# Patient Record
Sex: FEMALE
[De-identification: names, ages, dates, MRNs, and addresses within clinical notes are randomized per-mention and may not be internally consistent; named-entity substitution may affect disease eponyms.]

---

## 2020-01-05 ENCOUNTER — HOSPITAL ENCOUNTER (EMERGENCY)
Dept: HOSPITAL 18 - NAV ERS | Age: 20
Discharge: ANOTHER HOSPITAL | End: 2020-01-05
Payer: COMMERCIAL

## 2020-01-05 ENCOUNTER — HOSPITAL ENCOUNTER (INPATIENT)
Dept: HOSPITAL 92 - ERS | Age: 20
LOS: 5 days | Discharge: HOME | DRG: 417 | End: 2020-01-10
Attending: INTERNAL MEDICINE | Admitting: INTERNAL MEDICINE
Payer: COMMERCIAL

## 2020-01-05 VITALS — BODY MASS INDEX: 27.3 KG/M2

## 2020-01-05 DIAGNOSIS — E11.649: ICD-10-CM

## 2020-01-05 DIAGNOSIS — R07.9: Primary | ICD-10-CM

## 2020-01-05 DIAGNOSIS — K81.0: Primary | ICD-10-CM

## 2020-01-05 DIAGNOSIS — E86.0: ICD-10-CM

## 2020-01-05 DIAGNOSIS — E87.1: ICD-10-CM

## 2020-01-05 DIAGNOSIS — E87.5: ICD-10-CM

## 2020-01-05 DIAGNOSIS — K76.0: ICD-10-CM

## 2020-01-05 DIAGNOSIS — R10.11: ICD-10-CM

## 2020-01-05 DIAGNOSIS — E87.2: ICD-10-CM

## 2020-01-05 DIAGNOSIS — E11.10: ICD-10-CM

## 2020-01-05 DIAGNOSIS — I10: ICD-10-CM

## 2020-01-05 DIAGNOSIS — E87.6: ICD-10-CM

## 2020-01-05 LAB
ALBUMIN SERPL BCG-MCNC: 4.8 G/DL (ref 3.5–5)
ALP SERPL-CCNC: 88 U/L (ref 40–100)
ALT SERPL W P-5'-P-CCNC: 72 U/L (ref 8–55)
ANION GAP SERPL CALC-SCNC: (no result) MMOL/L (ref 10–20)
AST SERPL-CCNC: 31 U/L (ref 5–34)
BASOPHILS # BLD AUTO: 0.1 THOU/UL (ref 0–0.2)
BASOPHILS # BLD AUTO: 0.1 THOU/UL (ref 0–0.2)
BASOPHILS NFR BLD AUTO: 0.7 % (ref 0–1)
BASOPHILS NFR BLD AUTO: 0.9 % (ref 0–1)
BICARBONATE (HCO3V): 12 MMOL/L (ref 22–28)
BILIRUB SERPL-MCNC: 0.4 MG/DL (ref 0.2–1.2)
BUN SERPL-MCNC: 8 MG/DL (ref 7–18.7)
CA-I BLD-SCNC: 0.94 MMOL/L
CA-I BLDV-SCNC: 1.01 MMOL/L
CALCIUM SERPL-MCNC: 8.9 MG/DL (ref 7.8–10.44)
CHLORIDE BLDV-SCNC: 109 MMOL/L (ref 98–107)
CHLORIDE SERPL-SCNC: 111 MMOL/L (ref 98–107)
CHLORIDE SERPL-SCNC: 99 MMOL/L (ref 98–107)
CO2 BLDV CALC-SCNC: 11.3 MMOL/L (ref 22–28)
CO2 BLDV CALC-SCNC: 13 MMOL/L (ref 22–28)
CO2 SERPL-SCNC: (no result) MMOL/L (ref 22–29)
CO2 TENSION (PVCO2): 32.7 MMHG (ref 40–50)
CREAT CL PREDICTED SERPL C-G-VRATE: 0 ML/MIN (ref 70–130)
EOSINOPHIL # BLD AUTO: 0 THOU/UL (ref 0–0.7)
EOSINOPHIL # BLD AUTO: 0 THOU/UL (ref 0–0.7)
EOSINOPHIL NFR BLD AUTO: 0.1 % (ref 0–10)
EOSINOPHIL NFR BLD AUTO: 0.2 % (ref 0–10)
GLOBULIN SER CALC-MCNC: 2.9 G/DL (ref 2.4–3.5)
GLUCOSE SERPL-MCNC: 338 MG/DL (ref 70–105)
GLUCOSE UR STRIP-MCNC: 500 MG/DL
HCO3 BLDV-SCNC: 10.4 MMOL/L (ref 22–28)
HCT VFR BLD CALC: 38 % (ref 36–47)
HCT VFR BLD CALC: 38 % (ref 36–47)
HEMOGLOBIN - CALC: 12.9 G/DL (ref 12–16)
HGB BLD CALC-MCNC: 13 G/DL (ref 12–16)
HGB BLD-MCNC: 12.5 G/DL (ref 12–16)
HGB BLD-MCNC: 15 G/DL (ref 12–16)
LIPASE SERPL-CCNC: 103 U/L (ref 8–78)
LYMPHOCYTES # BLD AUTO: 2 THOU/UL (ref 1.2–3.4)
LYMPHOCYTES # BLD: 4.1 THOU/UL (ref 1.2–3.4)
LYMPHOCYTES NFR BLD AUTO: 14.3 % (ref 28–48)
LYMPHOCYTES NFR BLD AUTO: 26.3 % (ref 28–48)
MCH RBC QN AUTO: 28.6 PG (ref 25–35)
MCH RBC QN AUTO: 30.3 PG (ref 25–35)
MCV RBC AUTO: 87.6 FL (ref 78–98)
MCV RBC AUTO: 87.9 FL (ref 78–98)
MONOCYTES # BLD AUTO: 0.6 THOU/UL (ref 0.11–0.59)
MONOCYTES # BLD AUTO: 0.7 THOU/UL (ref 0.11–0.59)
MONOCYTES NFR BLD AUTO: 4.4 % (ref 0–4)
MONOCYTES NFR BLD AUTO: 4.6 % (ref 0–4)
NEUTROPHILS # BLD AUTO: 10.6 THOU/UL (ref 1.4–6.5)
NEUTROPHILS # BLD AUTO: 11.2 THOU/UL (ref 1.4–6.5)
NEUTROPHILS NFR BLD AUTO: 68.4 % (ref 31–61)
NEUTROPHILS NFR BLD AUTO: 80.1 % (ref 31–61)
PCO2 BLDV: 30.1 MMHG (ref 40–50)
PLATELET # BLD AUTO: 216 THOU/UL (ref 130–400)
PLATELET # BLD AUTO: 295 THOU/UL (ref 130–400)
POTASSIUM BLDV-SCNC: 4.6 MMOL/L (ref 3.5–5.1)
POTASSIUM SERPL-SCNC: 4.6 MMOL/L (ref 3.5–5.1)
POTASSIUM SERPL-SCNC: 4.7 MMOL/L (ref 3.5–5.1)
PREGS CONTROL BACKGROUND?: (no result)
PREGS CONTROL BAR APPEAR?: YES
PREGS CONTROL BAR APPEAR?: YES
PROT UR STRIP.AUTO-MCNC: (no result) MG/DL
RBC # BLD AUTO: 4.14 MILL/UL (ref 4–5.2)
RBC # BLD AUTO: 5.23 MILL/UL (ref 4–5.2)
SAO2 % BLDV FROM PO2: 49.7 % (ref 60–85)
SAO2 % BLDV FROM PO2: 96.7 % (ref 60–85)
SODIUM BLDV-SCNC: 133 MMOL/L (ref 138–145)
SODIUM SERPL-SCNC: 129 MMOL/L (ref 136–145)
SODIUM SERPL-SCNC: 133 MMOL/L (ref 138–145)
WBC # BLD AUTO: 14 THOU/UL (ref 4.8–10.8)
WBC # BLD AUTO: 15.5 THOU/UL (ref 4.8–10.8)
WBC UR QL AUTO: (no result) HPF (ref 0–3)

## 2020-01-05 PROCEDURE — 84100 ASSAY OF PHOSPHORUS: CPT

## 2020-01-05 PROCEDURE — 83690 ASSAY OF LIPASE: CPT

## 2020-01-05 PROCEDURE — 80076 HEPATIC FUNCTION PANEL: CPT

## 2020-01-05 PROCEDURE — 78227 HEPATOBIL SYST IMAGE W/DRUG: CPT

## 2020-01-05 PROCEDURE — 87040 BLOOD CULTURE FOR BACTERIA: CPT

## 2020-01-05 PROCEDURE — 84484 ASSAY OF TROPONIN QUANT: CPT

## 2020-01-05 PROCEDURE — 83036 HEMOGLOBIN GLYCOSYLATED A1C: CPT

## 2020-01-05 PROCEDURE — 36415 COLL VENOUS BLD VENIPUNCTURE: CPT

## 2020-01-05 PROCEDURE — 85025 COMPLETE CBC W/AUTO DIFF WBC: CPT

## 2020-01-05 PROCEDURE — 84703 CHORIONIC GONADOTROPIN ASSAY: CPT

## 2020-01-05 PROCEDURE — A9537 TC99M MEBROFENIN: HCPCS

## 2020-01-05 PROCEDURE — 82330 ASSAY OF CALCIUM: CPT

## 2020-01-05 PROCEDURE — 82010 KETONE BODYS QUAN: CPT

## 2020-01-05 PROCEDURE — 81003 URINALYSIS AUTO W/O SCOPE: CPT

## 2020-01-05 PROCEDURE — 36416 COLLJ CAPILLARY BLOOD SPEC: CPT

## 2020-01-05 PROCEDURE — S0028 INJECTION, FAMOTIDINE, 20 MG: HCPCS

## 2020-01-05 PROCEDURE — S0020 INJECTION, BUPIVICAINE HYDRO: HCPCS

## 2020-01-05 PROCEDURE — 93005 ELECTROCARDIOGRAM TRACING: CPT

## 2020-01-05 PROCEDURE — 71275 CT ANGIOGRAPHY CHEST: CPT

## 2020-01-05 PROCEDURE — 80307 DRUG TEST PRSMV CHEM ANLYZR: CPT

## 2020-01-05 PROCEDURE — 80053 COMPREHEN METABOLIC PANEL: CPT

## 2020-01-05 PROCEDURE — 83605 ASSAY OF LACTIC ACID: CPT

## 2020-01-05 PROCEDURE — 76705 ECHO EXAM OF ABDOMEN: CPT

## 2020-01-05 PROCEDURE — 81015 MICROSCOPIC EXAM OF URINE: CPT

## 2020-01-05 PROCEDURE — 96361 HYDRATE IV INFUSION ADD-ON: CPT

## 2020-01-05 PROCEDURE — 71046 X-RAY EXAM CHEST 2 VIEWS: CPT

## 2020-01-05 PROCEDURE — 96374 THER/PROPH/DIAG INJ IV PUSH: CPT

## 2020-01-05 PROCEDURE — 85379 FIBRIN DEGRADATION QUANT: CPT

## 2020-01-05 PROCEDURE — 83880 ASSAY OF NATRIURETIC PEPTIDE: CPT

## 2020-01-05 PROCEDURE — 80048 BASIC METABOLIC PNL TOTAL CA: CPT

## 2020-01-05 PROCEDURE — 87086 URINE CULTURE/COLONY COUNT: CPT

## 2020-01-05 PROCEDURE — 82803 BLOOD GASES ANY COMBINATION: CPT

## 2020-01-05 PROCEDURE — 88304 TISSUE EXAM BY PATHOLOGIST: CPT

## 2020-01-05 PROCEDURE — 80306 DRUG TEST PRSMV INSTRMNT: CPT

## 2020-01-05 PROCEDURE — 96375 TX/PRO/DX INJ NEW DRUG ADDON: CPT

## 2020-01-05 PROCEDURE — 83735 ASSAY OF MAGNESIUM: CPT

## 2020-01-05 PROCEDURE — C9113 INJ PANTOPRAZOLE SODIUM, VIA: HCPCS

## 2020-01-05 NOTE — RAD
PA AND LATERAL VIEWS CHEST:

 

Date:  01/05/20

 

HISTORY:  

Chest pain and dyspnea. 

 

FINDINGS:

The cardiomediastinum is normal. The lungs are well expanded and clear. The bony thorax is normal. 

 

IMPRESSION: 

Normal exam. 

 

 

POS: KERVIN

## 2020-01-05 NOTE — ULT
US Gallbladder RUQ



HISTORY: Right upper quadrant pain.



COMPARISON: None.



FINDINGS: Real-time imaging of the right upper quadrant shows a normal-appearing gallbladder. No gall
bladder wall thickening is seen. The common duct is 2 to 3 mm. There does appear to be a tiny

amount of fluid adjacent to the gallbladder. There are diffuse fatty changes of the liver. The techno
logist does describe a positive ultrasound Anthony's sign.



Right kidney is normal in size and not obstructed. The liver measures 20 cm in length. The pancreas i
s obscured.



IMPRESSION: No evidence of gallstones. There does appear to be some minimal fluid or edema adjacent t
o the gallbladder but no gallbladder wall thickening. Technologist does describe a positive

ultrasound Anthony's sign. Further evaluation with hepatobiliary scan may be beneficial. The liver roger
ws diffuse fatty change and appears slightly enlarged.



Reported By: Rashaun Jones 

Electronically Signed:  1/5/2020 11:45 PM

## 2020-01-05 NOTE — CT
CT ANGIO CHEST PERFORMED WITH IV CONTRAST ENHANCEMENT AND 3D RECONSTRUCTIONS:

 

Date:  01/05/2020

 

HISTORY:  

Shortness of breath. 

 

FINDINGS:

There are some minimal subsegmental atelectatic changes in the lung bases. No pleural effusions. 

 

The thoracic aorta is normal in caliber. No mediastinal or hilar adenopathy. 

 

There is fair pulmonary artery opacification. There is some motion artifact noted. I see no signs of 
any central pulmonary embolus. Smaller peripheral emboli are not excluded. 

 

There are diffuse fatty changes of the liver. Visualized portion of the pancreas appears unremarkable
. 

 

A hyperdense focus within the right lobe of the liver could represent fatty sparing. There is a simil
ar area in the left lobe. 

 

IMPRESSION: 

1.  No CT evidence for pulmonary embolus. 

2.  Fatty changes of the liver. 

 

 

POS: MARCELINA

## 2020-01-06 LAB
ALBUMIN SERPL BCG-MCNC: 3.5 G/DL (ref 3.5–5)
ALP SERPL-CCNC: 71 U/L (ref 40–100)
ALT SERPL W P-5'-P-CCNC: 49 U/L (ref 8–55)
ANION GAP SERPL CALC-SCNC: (no result) MMOL/L (ref 10–20)
ANION GAP SERPL CALC-SCNC: 11 MMOL/L (ref 10–20)
ANION GAP SERPL CALC-SCNC: 12 MMOL/L (ref 10–20)
ANION GAP SERPL CALC-SCNC: 14 MMOL/L (ref 10–20)
APAP SERPL-MCNC: (no result) MCG/ML (ref 10–30)
AST SERPL-CCNC: 27 U/L (ref 5–34)
BASOPHILS # BLD AUTO: 0.1 THOU/UL (ref 0–0.2)
BASOPHILS NFR BLD AUTO: 0.7 % (ref 0–1)
BILIRUB SERPL-MCNC: 0.4 MG/DL (ref 0.2–1.2)
BUN SERPL-MCNC: (no result) MG/DL (ref 7–18.7)
BUN SERPL-MCNC: 5 MG/DL (ref 7–18.7)
CALCIUM SERPL-MCNC: 7.2 MG/DL (ref 7.8–10.44)
CALCIUM SERPL-MCNC: 7.5 MG/DL (ref 7.8–10.44)
CALCIUM SERPL-MCNC: 7.7 MG/DL (ref 7.8–10.44)
CALCIUM SERPL-MCNC: 7.9 MG/DL (ref 7.8–10.44)
CHLORIDE SERPL-SCNC: 108 MMOL/L (ref 98–107)
CHLORIDE SERPL-SCNC: 109 MMOL/L (ref 98–107)
CHLORIDE SERPL-SCNC: 109 MMOL/L (ref 98–107)
CHLORIDE SERPL-SCNC: 110 MMOL/L (ref 98–107)
CO2 SERPL-SCNC: (no result) MMOL/L (ref 22–29)
CO2 SERPL-SCNC: 11 MMOL/L (ref 22–29)
CO2 SERPL-SCNC: 15 MMOL/L (ref 22–29)
CO2 SERPL-SCNC: 16 MMOL/L (ref 22–29)
CREAT CL PREDICTED SERPL C-G-VRATE: 0 ML/MIN (ref 70–130)
CREAT CL PREDICTED SERPL C-G-VRATE: 132 ML/MIN (ref 70–130)
CREAT CL PREDICTED SERPL C-G-VRATE: 138 ML/MIN (ref 70–130)
CREAT CL PREDICTED SERPL C-G-VRATE: 150 ML/MIN (ref 70–130)
DRUG SCREEN CUTOFF: (no result)
EOSINOPHIL # BLD AUTO: 0 THOU/UL (ref 0–0.7)
EOSINOPHIL NFR BLD AUTO: 0.3 % (ref 0–10)
GLOBULIN SER CALC-MCNC: 3.3 G/DL (ref 2.4–3.5)
GLUCOSE SERPL-MCNC: 154 MG/DL (ref 70–105)
GLUCOSE SERPL-MCNC: 180 MG/DL (ref 70–105)
GLUCOSE SERPL-MCNC: 212 MG/DL (ref 70–105)
GLUCOSE SERPL-MCNC: 228 MG/DL (ref 70–105)
HGB BLD-MCNC: 11.4 G/DL (ref 12–16)
LIPASE SERPL-CCNC: 106 U/L (ref 8–78)
LYMPHOCYTES # BLD: 3 THOU/UL (ref 1.2–3.4)
LYMPHOCYTES NFR BLD AUTO: 21.8 % (ref 28–48)
MCH RBC QN AUTO: 28.6 PG (ref 25–35)
MCV RBC AUTO: 85.5 FL (ref 78–98)
MEDTOX CONTROL LINE VALID?: (no result)
MEDTOX READER #: (no result)
MONOCYTES # BLD AUTO: 0.6 THOU/UL (ref 0.11–0.59)
MONOCYTES NFR BLD AUTO: 4.5 % (ref 0–4)
NEUTROPHILS # BLD AUTO: 9.9 THOU/UL (ref 1.4–6.5)
NEUTROPHILS NFR BLD AUTO: 72.8 % (ref 31–61)
PLATELET # BLD AUTO: 204 THOU/UL (ref 130–400)
POTASSIUM SERPL-SCNC: 3.5 MMOL/L (ref 3.5–5.1)
POTASSIUM SERPL-SCNC: 3.6 MMOL/L (ref 3.5–5.1)
POTASSIUM SERPL-SCNC: 3.7 MMOL/L (ref 3.5–5.1)
POTASSIUM SERPL-SCNC: 4.8 MMOL/L (ref 3.5–5.1)
PROT UR STRIP.AUTO-MCNC: 20 MG/DL
RBC # BLD AUTO: 3.98 MILL/UL (ref 4–5.2)
SALICYLATES SERPL-MCNC: (no result) MG/DL (ref 15–30)
SODIUM SERPL-SCNC: 131 MMOL/L (ref 136–145)
SODIUM SERPL-SCNC: 131 MMOL/L (ref 136–145)
SODIUM SERPL-SCNC: 132 MMOL/L (ref 136–145)
SODIUM SERPL-SCNC: 133 MMOL/L (ref 136–145)
WBC # BLD AUTO: 13.7 THOU/UL (ref 4.8–10.8)

## 2020-01-06 RX ADMIN — METRONIDAZOLE SCH MLS: 500 INJECTION, SOLUTION INTRAVENOUS at 05:51

## 2020-01-06 RX ADMIN — POTASSIUM CHLORIDE, DEXTROSE MONOHYDRATE AND SODIUM CHLORIDE PRN MLS: 150; 5; 450 INJECTION, SOLUTION INTRAVENOUS at 22:52

## 2020-01-06 RX ADMIN — METRONIDAZOLE SCH MLS: 500 INJECTION, SOLUTION INTRAVENOUS at 15:11

## 2020-01-06 RX ADMIN — POTASSIUM CHLORIDE, DEXTROSE MONOHYDRATE AND SODIUM CHLORIDE PRN MLS: 150; 5; 450 INJECTION, SOLUTION INTRAVENOUS at 10:21

## 2020-01-06 RX ADMIN — POTASSIUM CHLORIDE, DEXTROSE MONOHYDRATE AND SODIUM CHLORIDE PRN MLS: 150; 5; 450 INJECTION, SOLUTION INTRAVENOUS at 15:12

## 2020-01-06 RX ADMIN — CEFTRIAXONE SCH MLS: 1 INJECTION, POWDER, FOR SOLUTION INTRAMUSCULAR; INTRAVENOUS at 03:24

## 2020-01-06 RX ADMIN — METRONIDAZOLE SCH MLS: 500 INJECTION, SOLUTION INTRAVENOUS at 21:41

## 2020-01-06 RX ADMIN — POTASSIUM CHLORIDE, DEXTROSE MONOHYDRATE AND SODIUM CHLORIDE PRN MLS: 150; 5; 450 INJECTION, SOLUTION INTRAVENOUS at 06:23

## 2020-01-06 RX ADMIN — INSULIN HUMAN SCH MLS: 100 INJECTION, SOLUTION PARENTERAL at 15:12

## 2020-01-06 NOTE — PDOC.EVN
Event Note





- Event Note


Event Note: 


Patient admitted with normoglycemic DKA, now with normalized gap, still with 

some acidosis but much better. Patient still with significant RUQ pain, had 

positive Anthony's during U/S and some fluid around gallbladder, but no stones/

sludge/wall thickening. On Rocephin. Will check HIDA scan in AM to make sure 

gallbladder not the instigator behind the DKA.

## 2020-01-06 NOTE — HP
PRIMARY CARE PHYSICIAN:  The patient currently does not have a primary care

physician. 



CHIEF COMPLAINT:  Abdominal pain.



HISTORY OF PRESENT ILLNESS:  Ms. Baez is a pleasant 19-year-old female, who has a

history of diabetes during pregnancy.  She says that while being pregnant, she

developed diabetes mellitus and says that the diabetes continued until after the

pregnancy.  She admits to being placed on medications for diabetes several months

after being pregnant and it appears after that she has essentially lost to followup

and has not been on any medications.  She says that on Friday afternoon, she was

coming back from an outing and noticed that she was having pain in the right upper

quadrant.  She says it radiated into her chest and she also was having some nausea.

She says that the pain would actually get better if she try to lay on her left side.

 It was worse with movement.  She vomited once, but denies any fevers or chills.  No

change in her bowels.  She denies any polyuria or polydipsia or any change in her

visions.  But as a result of the pain, she came to the ER for evaluation.  There,

she was found to be severely acidotic with a bicarb less than 8.  Her pH was 7.1 on

blood gas.  Her blood sugar was only slightly elevated, but she has an anion gap and

positive beta hydroxybutyrate, and she is being admitted for DKA. 



REVIEW OF SYSTEMS:  All systems were reviewed and are negative except for that

mentioned in the history of present illness. 



PAST MEDICAL HISTORY:  Significant for diabetes mellitus during pregnancy and

hypertension. 



PAST SURGICAL HISTORY:  She had a , as well as an appendectomy.



ALLERGIES:  NO KNOWN DRUG ALLERGIES.



SOCIAL HISTORY:  She is a nonsmoker and nondrinker.  She has one child.



FAMILY HISTORY:  Significant for diabetes mellitus in both parents.



CURRENT MEDICATIONS:  None.



PHYSICAL EXAMINATION:

GENERAL:  She is alert and oriented.  She appears to be in no acute distress.  She

is well developed and well nourished. 

VITAL SIGNS:  Her blood pressure was 136/90, heart rate 138, respiratory rate of 20,

temperature was 100, O2 saturation is 100% on room air. 

HEENT:  Pupils are equal, round, and reactive.  Extraocular muscles are intact.  Her

sclerae anicteric.  Throat, no erythema, no exudates. 

NECK:  No adenopathy.  No bruits. 

LUNGS:  Clear to auscultation.  There is no wheezing, no rales, no rhonchi. 

CARDIOVASCULAR:  She has a normal S1 and S2.  There is no S3 or S4.  No murmurs,

clicks, or rubs.  Heart rate is tachycardic. 

ABDOMEN:  Soft, obese.  She does have right upper quadrant tenderness.  She did have

some mild rebound tenderness, but there was no guarding.  Bowel sounds are present.

Her liver span did appear to be slightly enlarged. 

EXTREMITIES:  There is no clubbing or cyanosis.  No edema.  No calf tenderness.  No

joint effusions.  Good dorsalis pedis and posterior tibial pulses. 

SKIN AND INTEGUMENT:  No skin changes.  No rash.



LABORATORY RESULTS:  White blood cell count 15.5, hemoglobin 12.5, hematocrit is

36.3, and platelet count is 216.  Sodium 138, potassium 4.6, chloride is 111.

Urinalysis shows ketones as well as glucose, and beta hydroxybutyrate was elevated

at 3.18.  She had an abdominal ultrasound in which there was evidence of fatty

change.  No evidence of gallstones, but there was some minimal fluid adjacent to the

gallbladder, but no gallbladder wall thickening. 



ASSESSMENT:  This is a 19-year-old female, who presents to the emergency room with

abdominal pain, elevated white count, metabolic acidosis with high anion gap, I

calculated it out to be approximately 16, as well as positive beta hydroxybutyrate.

She will be admitted for diabetic ketoacidosis and started on an insulin drip.  She

may need D5 solution while on the insulin drip to protect her from hypoglycemia

until her anion gap and acidosis have improved.  We will re-evaluate her with regard

to the abdominal 

pain.  If this pain persist after the acidosis has improved, then it would likely be

in her best interest to get a HIDA scan to rule out acalculous cholecystitis.  We

will place her 

on empiric antibiotics including Rocephin and Flagyl.  In the interim, get blood

cultures as well.  Further recommendations to follow. 







Job ID:  [id number]

## 2020-01-07 LAB
ANION GAP SERPL CALC-SCNC: 11 MMOL/L (ref 10–20)
ANION GAP SERPL CALC-SCNC: 13 MMOL/L (ref 10–20)
BASOPHILS # BLD AUTO: 0 THOU/UL (ref 0–0.2)
BASOPHILS NFR BLD AUTO: 0.6 % (ref 0–1)
BUN SERPL-MCNC: (no result) MG/DL (ref 7–18.7)
BUN SERPL-MCNC: (no result) MG/DL (ref 7–18.7)
CALCIUM SERPL-MCNC: 8.2 MG/DL (ref 7.8–10.44)
CALCIUM SERPL-MCNC: 8.4 MG/DL (ref 7.8–10.44)
CHLORIDE SERPL-SCNC: 106 MMOL/L (ref 98–107)
CHLORIDE SERPL-SCNC: 107 MMOL/L (ref 98–107)
CO2 SERPL-SCNC: 21 MMOL/L (ref 22–29)
CO2 SERPL-SCNC: 23 MMOL/L (ref 22–29)
CREAT CL PREDICTED SERPL C-G-VRATE: 175 ML/MIN (ref 70–130)
CREAT CL PREDICTED SERPL C-G-VRATE: 183 ML/MIN (ref 70–130)
EOSINOPHIL # BLD AUTO: 0.1 THOU/UL (ref 0–0.7)
EOSINOPHIL NFR BLD AUTO: 1 % (ref 0–10)
GLUCOSE SERPL-MCNC: 165 MG/DL (ref 70–105)
GLUCOSE SERPL-MCNC: 205 MG/DL (ref 70–105)
HGB BLD-MCNC: 10.9 G/DL (ref 12–16)
LYMPHOCYTES # BLD: 1.7 THOU/UL (ref 1.2–3.4)
LYMPHOCYTES NFR BLD AUTO: 20.2 % (ref 28–48)
MCH RBC QN AUTO: 28.7 PG (ref 25–35)
MCV RBC AUTO: 84.3 FL (ref 78–98)
MONOCYTES # BLD AUTO: 0.5 THOU/UL (ref 0.11–0.59)
MONOCYTES NFR BLD AUTO: 5.6 % (ref 0–4)
NEUTROPHILS # BLD AUTO: 6 THOU/UL (ref 1.4–6.5)
NEUTROPHILS NFR BLD AUTO: 72.7 % (ref 31–61)
PLATELET # BLD AUTO: 194 THOU/UL (ref 130–400)
POTASSIUM SERPL-SCNC: 3.3 MMOL/L (ref 3.5–5.1)
POTASSIUM SERPL-SCNC: 3.7 MMOL/L (ref 3.5–5.1)
RBC # BLD AUTO: 3.79 MILL/UL (ref 4–5.2)
SODIUM SERPL-SCNC: 136 MMOL/L (ref 136–145)
SODIUM SERPL-SCNC: 138 MMOL/L (ref 136–145)
WBC # BLD AUTO: 8.2 THOU/UL (ref 4.8–10.8)

## 2020-01-07 RX ADMIN — POTASSIUM CHLORIDE, DEXTROSE MONOHYDRATE AND SODIUM CHLORIDE PRN MLS: 150; 5; 450 INJECTION, SOLUTION INTRAVENOUS at 03:04

## 2020-01-07 RX ADMIN — METRONIDAZOLE SCH MLS: 500 INJECTION, SOLUTION INTRAVENOUS at 14:03

## 2020-01-07 RX ADMIN — ONDANSETRON PRN MG: 2 INJECTION INTRAMUSCULAR; INTRAVENOUS at 14:23

## 2020-01-07 RX ADMIN — METRONIDAZOLE SCH MLS: 500 INJECTION, SOLUTION INTRAVENOUS at 22:07

## 2020-01-07 RX ADMIN — POTASSIUM CHLORIDE, DEXTROSE MONOHYDRATE AND SODIUM CHLORIDE PRN MLS: 150; 5; 450 INJECTION, SOLUTION INTRAVENOUS at 11:30

## 2020-01-07 RX ADMIN — METRONIDAZOLE SCH MLS: 500 INJECTION, SOLUTION INTRAVENOUS at 06:14

## 2020-01-07 RX ADMIN — POTASSIUM CHLORIDE, DEXTROSE MONOHYDRATE AND SODIUM CHLORIDE PRN MLS: 150; 5; 450 INJECTION, SOLUTION INTRAVENOUS at 17:27

## 2020-01-07 RX ADMIN — CEFTRIAXONE SCH MLS: 1 INJECTION, POWDER, FOR SOLUTION INTRAMUSCULAR; INTRAVENOUS at 03:03

## 2020-01-07 RX ADMIN — ONDANSETRON PRN MG: 2 INJECTION INTRAMUSCULAR; INTRAVENOUS at 10:19

## 2020-01-07 RX ADMIN — INSULIN HUMAN SCH MLS: 100 INJECTION, SOLUTION PARENTERAL at 07:22

## 2020-01-07 RX ADMIN — POTASSIUM CHLORIDE, DEXTROSE MONOHYDRATE AND SODIUM CHLORIDE PRN MLS: 150; 5; 450 INJECTION, SOLUTION INTRAVENOUS at 07:22

## 2020-01-07 NOTE — NM
HEPATOBILIARY SCAN:

 

HISTORY: 

Abdominal pain.  No gallstones on gallbladder of 1/5/2020.

 

RADIOPHARMACEUTICAL:

4.5 mCi Technetium 99m-Mebrofenin injected intravenously.

 

FINDINGS: 

There is good tracer extraction by the liver with prompt excretion into the biliary tract and small b
owel loops and normal filling of the gallbladder.  The calculated gallbladder ejection fraction after
 a 30-minute IV infusion of 1.1 mcg CCK measures 22%.

 

The patient was also pretreated with an IV dose of 1.1 mcg CCK-8 30 minutes prior to the ingestion of
 the radiopharmaceutical.

 

IMPRESSION: 

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

 

POS: OFF

## 2020-01-07 NOTE — CON
DATE OF CONSULTATION:  



REQUESTING PHYSICIAN:  Jose Elias Campos MD.



CONSULTING PHYSICIAN:  Dr. Rose.



HISTORY OF PRESENT ILLNESS:  Ms. Baez is a 19-year-old female who came into

evaluation of abdominal pain.  The patient reports she had abdominal pain since

Friday last week, which was going on for 5 days now.  Pain is in the upper right

quadrant.  At first pain did come and go, pain was getting worse after a meal.

Since yesterday, the patient reports right upper quadrant pain became more constant.

 The patient reports no fever or shortness of breath.  Nausea and vomiting resolved.

 Bowel and urination are normal.  The patient reports this is the first time she has

this kind of pain. 



REVIEW OF SYSTEMS:  Noncontributory except per HPI.



PAST MEDICAL HISTORY:  Gestational diabetes.



PAST SURGICAL HISTORY:   and appendectomy.



ALLERGIES:  NO KNOWN NO ALLERGIES.



SOCIAL HISTORY:  The patient lives at home.  Denies drug use.  Denies smoking.

Drinks socially. 



CURRENT MEDICATION:  None.



PHYSICAL EXAMINATION:

GENERAL:  The patient lying on bed comfortable with no acute respiratory distress.

Pain is minimum at around 5-6/10.  VITAL SIGNS:  Temperature 97.9, blood pressure

114/69, heart rate 84, respiratory rate 18. 

LUNGS:  Clear bilaterally. 

HEART:  Regular rate and rhythm. 

ABDOMEN:  Soft, nondistended.  Pain from the right upper quadrant, only she has with

palpation.  Bowel sounds normal. 

EXTREMITIES:  Neurovascularly intact x4.



LABORATORY DATA:  Initial workup with abdominal ultrasound showed no evidence of

gallstone.  There appeared some minimal fluid or edema adjacent to the gallbladder,

but no gallbladder wall thickness, positive Anthony sign.  HIDA scan consistent with

chronic acalculous cholecystitis or gallbladder dyskinesia. 



PLAN:  The patient was examined by Dr. Rose.  The patient will be n.p.o. at

midnight and Dr. Rose will take the patient to the OR tomorrow.  Continue pain

control.  Continue IV fluid.  Continue nonpharmacological DVT prophylaxis. 







Job ID:  [id number]

## 2020-01-07 NOTE — PDOC.HOSPP
- Subjective


Encounter Date: 01/07/20


Encounter Time: 14:00


Subjective: 


Patient with persistent KARO and RUQ pain, otherwise feeling a bit better today. 

Had HIDA scan, just arrived back and feeling nauseated. NPO currently.





- Objective


Vital Signs & Weight: 


 Vital Signs (12 hours)











  Temp


 


 01/07/20 07:04  97.7 F


 


 01/07/20 03:28  98.1 F


 


 01/06/20 23:14  98.9 F








 Weight











Admit Weight                   131 lb


 


Weight                         131 lb











 Most Recent Monitor Data











Heart Rate from ECG            104


 


NIBP                           122/78


 


NIBP BP-Mean                   92


 


Respiration from ECG           3


 


SpO2                           97














I&O: 


 











 01/06/20 01/07/20 01/08/20





 06:59 06:59 06:59


 


Intake Total 1290 6210.8 


 


Output Total 2100 3375 


 


Balance -810 2835.8 











Result Diagrams: 


 01/07/20 03:34





 01/07/20 03:34


Additional Labs: 


 Accuchecks











  01/07/20 01/07/20 01/07/20





  10:06 08:57 08:04


 


POC Glucose  206 H  169 H  166 H














  01/07/20 01/07/20 01/07/20





  07:26 06:07 05:18


 


POC Glucose  161 H  143 H  153 H














  01/07/20 01/07/20 01/07/20





  04:11 03:01 02:05


 


POC Glucose  158 H  152 H  160 H














  01/07/20 01/07/20 01/06/20





  01:06 00:05 22:59


 


POC Glucose  158 H  158 H  156 H














  01/06/20 01/06/20 01/06/20





  22:05 21:24 20:10


 


POC Glucose  190 H  171 H  187 H














  01/06/20 01/06/20 01/06/20





  19:00 18:09 17:27


 


POC Glucose  193 H  204 H  199 H














  01/06/20 01/06/20 01/06/20





  16:11 15:06 14:15


 


POC Glucose  211 H  185 H  167 H














  01/06/20 01/06/20 01/06/20





  13:05 12:05 11:09


 


POC Glucose  141 H  163 H  164 H














Hospitalist ROS





- Review of Systems


Constitutional: denies: fever, chills


Respiratory: denies: cough, shortness of breath


Cardiovascular: denies: chest pain, palpitations, orthopnea


Gastrointestinal: reports: nausea, abdominal pain.  denies: vomiting, diarrhea, 

constipation


Genitourinary: denies: dysuria





- Medication


Medications: 


Active Medications











Generic Name Dose Route Start Last Admin





  Trade Name Freq  PRN Reason Stop Dose Admin


 


Enoxaparin Sodium  40 mg  01/06/20 09:00  01/07/20 09:35





  Lovenox  SC   40 mg





  0900 BARRINGTON   Administration





     





     





     





     


 


Ceftriaxone Sodium 1 gm/  100 mls @ 200 mls/hr  01/06/20 03:00  01/07/20 03:03





  Sodium Chloride  IVPB   100 mls





  Q24HR BARRINGTON   Administration





     





     





     





     


 


Potassium Chloride/Dextrose/Sod Cl  1,000 mls @ 250 mls/hr  01/06/20 03:00  01/ 07/20 07:22





  D5 1/2 Ns W/20 Meq Kcl  IV   1,000 mls





  .Q4H PRN   Administration





  Step 4 of DKA Protocol   





     





  Protocol   





     


 


Insulin Human Regular 100  101 mls @ 0 mls/hr  01/06/20 03:00  01/07/20 07:22





  units/ Sodium Chloride  IVPB   101 mls





  INF BARRINGTON   Administration





     





     





  Protocol   





  Titrate   


 


Metronidazole 500 mg/ Device  100 mls @ 100 mls/hr  01/06/20 06:00  01/07/20 06:

14





  IVPB   100 mls





  Q8HR BARRINGTON   Administration





     





     





     





     


 


Potassium Chloride 40 meq/  270 mls @ 135 mls/hr  01/06/20 03:06  01/07/20 09:35





  Sodium Chloride  IVPB   270 mls





  ASDIR PRN   Administration





  FOR SERUM K+ 2.5 - 3.5   





     





     





     


 


Potassium Phosphate 9 mmol/  103 mls @ 25.75 mls/hr  01/06/20 03:06  01/07/20 04

:56





  Sodium Chloride  IVPB   103 mls





  ASDIR PRN   Administration





  Phosphate 1.0-1.8   





     





     





     


 


Miscellaneous Medication  1 pkt  01/06/20 03:06  01/06/20 04:31





  Phos-Nak  PO   1 pkt





  TIDPRN PRN   Administration





  FOR PHOS LEVEL 1.0 - 1.8   





     





     





     


 


Morphine Sulfate  4 mg  01/06/20 15:38  01/07/20 04:58





  Morphine  SLOW IVP   4 mg





  Q4H PRN   Administration





  Severe Pain (7-10)   





     





     





     


 


Ondansetron HCl  4 mg  01/06/20 03:00  01/07/20 10:19





  Zofran  IVP   4 mg





  Q6H PRN   Administration





  Nausea/Vomiting   





     





     





     














- Exam


General Appearance: NAD, awake alert


Eye: anicteric sclera


ENT: moist mucosa


Heart: RRR, no murmur, no gallops, no rubs


Respiratory: CTAB, no wheezes, no rales, no ronchi


Gastrointestinal: soft, non-distended, normal bowel sounds, tender to palpation


Gastrointestinal - other findings: positive Anthony's sign


Neurological: no focal deficits


Psychiatric: normal affect, normal behavior, A&O x 3





Hosp A/P


(1) Diabetic ketoacidosis


Code(s): E11.10 - TYPE 2 DIABETES MELLITUS WITH KETOACIDOSIS WITHOUT COMA   

Status: Acute   





(2) Abdominal pain


Code(s): R10.9 - UNSPECIFIED ABDOMINAL PAIN   Status: Acute   





(3) Diabetes mellitus


Code(s): E11.9 - TYPE 2 DIABETES MELLITUS WITHOUT COMPLICATIONS   Status: Acute

   





- Plan


continue antibiotics


Leukocytosis resolved, on Rocephin and Metronidazole, Blood cultures NGTD


Normoglycemic DKA, unusual presentation but has responded very well to IV sugar 

and insulin, gap closed and bicarb up above 20


Persistent abdominal pain with positive sonographic anthony's sign yesterday, 

concern for acute cholecystitis as source of 


tip over into DKA. Checking HIDA scan this morning- EF 22%, Dr. Rose consulted





HbA1c 12, patient has likely had undiagnosed DM for some time, possibly ever 

since her pregnancy. May be Type 1 now, will need insulin


on discharge. Continue insulin drip for now with D5NS drip until surgical 

consultation and decision.

## 2020-01-08 LAB
ANION GAP SERPL CALC-SCNC: 11 MMOL/L (ref 10–20)
BUN SERPL-MCNC: (no result) MG/DL (ref 7–18.7)
CALCIUM SERPL-MCNC: 8.7 MG/DL (ref 7.8–10.44)
CHLORIDE SERPL-SCNC: 104 MMOL/L (ref 98–107)
CO2 SERPL-SCNC: 27 MMOL/L (ref 22–29)
CREAT CL PREDICTED SERPL C-G-VRATE: 165 ML/MIN (ref 70–130)
GLUCOSE SERPL-MCNC: 212 MG/DL (ref 70–105)
POTASSIUM SERPL-SCNC: 3.5 MMOL/L (ref 3.5–5.1)
SODIUM SERPL-SCNC: 138 MMOL/L (ref 136–145)

## 2020-01-08 PROCEDURE — 0FT44ZZ RESECTION OF GALLBLADDER, PERCUTANEOUS ENDOSCOPIC APPROACH: ICD-10-PCS | Performed by: SURGERY

## 2020-01-08 RX ADMIN — METRONIDAZOLE SCH MLS: 500 INJECTION, SOLUTION INTRAVENOUS at 22:21

## 2020-01-08 RX ADMIN — METRONIDAZOLE SCH MLS: 500 INJECTION, SOLUTION INTRAVENOUS at 13:27

## 2020-01-08 RX ADMIN — CEFTRIAXONE SCH MLS: 1 INJECTION, POWDER, FOR SOLUTION INTRAMUSCULAR; INTRAVENOUS at 02:59

## 2020-01-08 RX ADMIN — INSULIN HUMAN PRN UNITS: 100 INJECTION, SOLUTION PARENTERAL at 22:43

## 2020-01-08 RX ADMIN — ONDANSETRON PRN MG: 2 INJECTION INTRAMUSCULAR; INTRAVENOUS at 04:40

## 2020-01-08 RX ADMIN — INSULIN GLARGINE SCH: 100 INJECTION, SOLUTION SUBCUTANEOUS at 22:51

## 2020-01-08 RX ADMIN — METRONIDAZOLE SCH MLS: 500 INJECTION, SOLUTION INTRAVENOUS at 05:52

## 2020-01-08 RX ADMIN — INSULIN HUMAN PRN UNITS: 100 INJECTION, SOLUTION PARENTERAL at 08:49

## 2020-01-08 NOTE — PDOC.HOSPP
- Subjective


Encounter Date: 01/08/20


Encounter Time: 12:00


Subjective: 


Patient feeling a bit better this AM. Off insulin drip, had Lantus injection 

last night. Still with some KARO/RUQ pain and nausea. No fever. Add on to 

surgery today so likely in the afternoon will go for cholecystectomy.





- Objective


Vital Signs & Weight: 


 Vital Signs (12 hours)











  Temp Pulse Ox


 


 01/08/20 08:00   98


 


 01/08/20 07:25  97.7 F 


 


 01/08/20 04:00  98.4 F 


 


 01/08/20 00:00  99 F 








 Weight











Admit Weight                   131 lb


 


Weight                         131 lb











 Most Recent Monitor Data











Heart Rate from ECG            103


 


NIBP                           129/80


 


NIBP BP-Mean                   96


 


Respiration from ECG           20


 


SpO2                           96














I&O: 


 











 01/07/20 01/08/20 01/09/20





 06:59 06:59 06:59


 


Intake Total 6210.8 4123.2 


 


Output Total 3375 4800 


 


Balance 2835.8 -676.8 











Result Diagrams: 


 01/07/20 03:34





 01/08/20 02:58


Additional Labs: 


 Accuchecks











  01/08/20 01/08/20 01/08/20





  08:03 05:59 04:16


 


POC Glucose  224 H  257 H  209 H














  01/08/20 01/08/20 01/07/20





  02:23 00:14 23:21


 


POC Glucose  193 H  132 H  128 H














  01/07/20 01/07/20 01/07/20





  22:10 21:11 20:16


 


POC Glucose  112 H  145 H  162 H














  01/07/20 01/07/20 01/07/20





  19:17 18:13 16:02


 


POC Glucose  187 H  200 H  194 H














  01/07/20 01/07/20 01/07/20





  15:07 14:10 13:20


 


POC Glucose  200 H  191 H  204 H














  01/07/20 01/07/20 01/07/20





  12:26 10:48 10:06


 


POC Glucose  212 H  219 H  206 H














Hospitalist ROS





- Review of Systems


Constitutional: denies: fever, chills


Respiratory: denies: cough, shortness of breath


Cardiovascular: denies: chest pain, palpitations, orthopnea


Gastrointestinal: reports: nausea, abdominal pain.  denies: vomiting, diarrhea, 

constipation





- Medication


Medications: 


Active Medications











Generic Name Dose Route Start Last Admin





  Trade Name Freq  PRN Reason Stop Dose Admin


 


Enoxaparin Sodium  40 mg  01/06/20 09:00  01/08/20 08:47





  Lovenox  SC   Not Given





  0900 BARRINGTON   





     





     





     





     


 


Ceftriaxone Sodium 1 gm/  100 mls @ 200 mls/hr  01/06/20 03:00  01/08/20 02:59





  Sodium Chloride  IVPB   100 mls





  Q24HR BARRINGTON   Administration





     





     





     





     


 


Potassium Chloride/Dextrose/Sod Cl  1,000 mls @ 250 mls/hr  01/06/20 03:00  01/ 07/20 17:27





  D5 1/2 Ns W/20 Meq Kcl  IV   1,000 mls





  .Q4H PRN   Administration





  Step 4 of DKA Protocol   





     





  Protocol   





     


 


Insulin Human Regular 100  101 mls @ 0 mls/hr  01/06/20 03:00  01/07/20 07:22





  units/ Sodium Chloride  IVPB   101 mls





  INF BARRINGTON   Administration





     





     





  Protocol   





  Titrate   


 


Metronidazole 500 mg/ Device  100 mls @ 100 mls/hr  01/06/20 06:00  01/08/20 05:

52





  IVPB   100 mls





  Q8HR BARRINGTON   Administration





     





     





     





     


 


Potassium Chloride 40 meq/  270 mls @ 135 mls/hr  01/06/20 03:06  01/07/20 09:35





  Sodium Chloride  IVPB   270 mls





  ASDIR PRN   Administration





  FOR SERUM K+ 2.5 - 3.5   





     





     





     


 


Potassium Phosphate 9 mmol/  103 mls @ 25.75 mls/hr  01/06/20 03:06  01/07/20 04

:56





  Sodium Chloride  IVPB   103 mls





  ASDIR PRN   Administration





  Phosphate 1.0-1.8   





     





     





     


 


Insulin Human Regular  0 units  01/08/20 06:17  01/08/20 08:49





  Humulin R  SC   3 units





  .MILD SLIDING SCALE PRN   Administration





  Mild Correctional Scale   





     





     





     


 


Miscellaneous Medication  1 pkt  01/06/20 03:06  01/06/20 04:31





  Phos-Nak  PO   1 pkt





  TIDPRN PRN   Administration





  FOR PHOS LEVEL 1.0 - 1.8   





     





     





     


 


Morphine Sulfate  4 mg  01/06/20 15:38  01/08/20 04:41





  Morphine  SLOW IVP   4 mg





  Q4H PRN   Administration





  Severe Pain (7-10)   





     





     





     


 


Ondansetron HCl  4 mg  01/06/20 03:00  01/08/20 04:40





  Zofran  IVP   4 mg





  Q6H PRN   Administration





  Nausea/Vomiting   





     





     





     














- Exam


General Appearance: NAD


Eye: anicteric sclera


ENT: moist mucosa


Heart: RRR, no murmur, no gallops, no rubs


Respiratory: CTAB, no wheezes, no rales, no ronchi


Gastrointestinal: soft, non-distended, normal bowel sounds, no rigidity, tender 

to palpation, voluntary guarding


Gastrointestinal - other findings: TTP KARO/RUQ


Neurological: no focal deficits


Psychiatric: normal affect, normal behavior, A&O x 3





Hosp A/P


(1) Diabetic ketoacidosis


Code(s): E11.10 - TYPE 2 DIABETES MELLITUS WITH KETOACIDOSIS WITHOUT COMA   

Status: Resolved   





(2) Abdominal pain


Code(s): R10.9 - UNSPECIFIED ABDOMINAL PAIN   Status: Acute   





(3) Diabetes mellitus


Code(s): E11.9 - TYPE 2 DIABETES MELLITUS WITHOUT COMPLICATIONS   Status: Acute

   





- Plan


Leukocytosis resolved, on Rocephin and Metronidazole, Blood cultures NGTD


Normoglycemic DKA, unusual presentation but has responded very well to IV sugar 

and insulin, gap closed and bicarb normalized





Transitioned to long acting insulin last night and off insulin drip. Blood 

sugars a little high, will switch to 1/2NS





Persistent abdominal pain with positive sonographic mukherjee's sign yesterday, 

concern for acute cholecystitis as source of 


tip over into DKA. HIDA scan- EF 22%, Dr. Rose consulted- planning on 

cholecystectomy





HbA1c 12, patient has likely had undiagnosed DM for some time, possibly ever 

since her pregnancy. May be Type 1 now, will need insulin


on discharge.

## 2020-01-09 LAB
ALBUMIN SERPL BCG-MCNC: 3.2 G/DL (ref 3.5–5)
ALP SERPL-CCNC: 63 U/L (ref 40–110)
ALT SERPL W P-5'-P-CCNC: 45 U/L (ref 8–55)
ANION GAP SERPL CALC-SCNC: 14 MMOL/L (ref 10–20)
AST SERPL-CCNC: 49 U/L (ref 5–34)
BILIRUB DIRECT SERPL-MCNC: 0.1 MG/DL (ref 0.1–0.3)
BILIRUB SERPL-MCNC: 0.2 MG/DL (ref 0.2–1.2)
BUN SERPL-MCNC: 6 MG/DL (ref 7–18.7)
CALCIUM SERPL-MCNC: 8.3 MG/DL (ref 7.8–10.44)
CHLORIDE SERPL-SCNC: 102 MMOL/L (ref 98–107)
CO2 SERPL-SCNC: 25 MMOL/L (ref 22–29)
CREAT CL PREDICTED SERPL C-G-VRATE: 158 ML/MIN (ref 70–130)
GLUCOSE SERPL-MCNC: 160 MG/DL (ref 70–105)
HGB BLD-MCNC: 10.9 G/DL (ref 12–16)
MAGNESIUM SERPL-MCNC: 1.6 MG/DL (ref 1.6–2.6)
MCH RBC QN AUTO: 29 PG (ref 27–31)
MCV RBC AUTO: 87 FL (ref 78–98)
MDIFF COMPLETE?: YES
PLATELET # BLD AUTO: 254 THOU/UL (ref 130–400)
POTASSIUM SERPL-SCNC: 3.3 MMOL/L (ref 3.5–5.1)
RBC # BLD AUTO: 3.75 MILL/UL (ref 4.2–5.4)
SODIUM SERPL-SCNC: 138 MMOL/L (ref 136–145)
WBC # BLD AUTO: 7.3 THOU/UL (ref 4.8–10.8)

## 2020-01-09 RX ADMIN — METRONIDAZOLE SCH MLS: 500 INJECTION, SOLUTION INTRAVENOUS at 05:21

## 2020-01-09 RX ADMIN — ONDANSETRON PRN MG: 2 INJECTION INTRAMUSCULAR; INTRAVENOUS at 00:09

## 2020-01-09 RX ADMIN — CEFTRIAXONE SCH MLS: 1 INJECTION, POWDER, FOR SOLUTION INTRAMUSCULAR; INTRAVENOUS at 02:56

## 2020-01-09 RX ADMIN — ONDANSETRON PRN MG: 2 INJECTION INTRAMUSCULAR; INTRAVENOUS at 12:44

## 2020-01-09 RX ADMIN — INSULIN HUMAN PRN UNITS: 100 INJECTION, SOLUTION PARENTERAL at 17:32

## 2020-01-09 RX ADMIN — INSULIN HUMAN PRN UNITS: 100 INJECTION, SOLUTION PARENTERAL at 23:56

## 2020-01-09 RX ADMIN — METRONIDAZOLE SCH MLS: 500 INJECTION, SOLUTION INTRAVENOUS at 23:00

## 2020-01-09 RX ADMIN — INSULIN GLARGINE SCH MLS: 100 INJECTION, SOLUTION SUBCUTANEOUS at 20:22

## 2020-01-09 RX ADMIN — INSULIN HUMAN PRN UNITS: 100 INJECTION, SOLUTION PARENTERAL at 12:33

## 2020-01-09 RX ADMIN — METRONIDAZOLE SCH MLS: 500 INJECTION, SOLUTION INTRAVENOUS at 14:53

## 2020-01-09 RX ADMIN — INSULIN HUMAN PRN UNITS: 100 INJECTION, SOLUTION PARENTERAL at 09:08

## 2020-01-09 NOTE — OP
DATE OF PROCEDURE:  01/08/2020



PREOPERATIVE DIAGNOSES:  

1. Acute acalculous cholecystitis.

2. Status post diabetic ketoacidosis.



POSTOPERATIVE DIAGNOSES:  

1. Acute acalculous cholecystitis.

2. Status post diabetic ketoacidosis.



OPERATION PERFORMED:  Laparoscopic cholecystectomy.



ANESTHESIA:  General endotracheal.



ESTIMATED BLOOD LOSS:  5 mL.



FLUIDS GIVEN:  1200 mL crystalloids.



COUNTS:  Sponge and instrument counts were verified as correct x2.



COMPLICATIONS:  None apparent at the time of operation.



INDICATIONS FOR OPERATION:  This is a 19-year-old  woman, G1, P1, who

presented with DKA. 



Clinical radiographic examination was consistent with acute acalculous

cholecystitis. 



DKA is resolved and patient was brought to the operating room today for laparoscopic

cholecystectomy. 



Findings are consistent with dilated gallbladder devoid of stones. 



The gallbladder was partially encased by omental adhesions.



DESCRIPTION OF PROCEDURE:  Informed consent was obtained from the patient, brought

to the operating room and placed in supine position. 



Following general anesthesia, the abdomen was sterilely prepped and draped in usual

fashion. 



The skin below the umbilicus was infiltrated with 0.25% Marcaine with epinephrine. 



A small curvilinear infraumbilical incision was made using 11 scalpel. 



Umbilical stalk grasped with Kocher and elevated. 



Veress needle was inserted through the incision and placed in the peritoneal cavity

through which the abdomen was insufflated with 3 L of CO2 gas. 



Intraabdominal pressure noted at 1 mmHg. 



Following abdominal insufflation, Veress needle was removed and a 5 mm trocar was

introduced using a Visiport under laparoscopy. 



Laparoscopy confirmed proper placement of the port.  No injuries to underlying

structures. 



Additional laparoscopy reveals distended gallbladder in the usual anatomic location,

partially encased by omental adhesions. 



Under direct laparoscopy, a 12 mm epigastric and two 5 mm right lateral subcostal

ports were placed after the overlying skin were infiltrated with 0.25% Marcaine with

epinephrine.  Appropriate incision was made. 



The patient was placed in a reverse Trendelenburg position, rotated to her left. 



I introduced a Prestige grasper through the right lateral subcostal port grasping

the fundus of the gallbladder, which was elevated cephalad. 



Omental adhesions were then carefully taken down from the gallbladder using a

[place] dissector with cautery. 



An intrahepatic gallbladder was visualized. 



A second Prestige grasper was introduced through the right medial subcostal port

grasping the Eric's pouch, which was retracted laterally. 



Anterior coursing cystic artery was carefully dissected free from surrounding

structures and divided between clips, applying 2 clips proximally and 1 clip at the

junction of the cystic artery and gallbladder.  Cystic duct was then dissected free

from surrounding structures and divided between clips in a similar fashion. 



An intrahepatic gallbladder was removed from the liver bed using cautery with good

hemostasis. 



This was passed off the operative field for pathology. 



Operative site was inspected for good hemostasis. 



Finding no other pathology, laparoscopy was terminated. 



Fascia of the epigastric port was closed using 0 Vicryl suture and Endoclosure

device on the laparoscopy. 



The abdomen was desufflated. 



All ports and instruments removed and accounted for. 



Skin incisions were closed using 4-0 Monocryl suture in subcuticular fashion. 



Dermabond was applied over incisional closure. 



The patient tolerated the operation without any apparent complication and was

returned to recovery room in satisfactory condition. 







Job ID:  [id number]

## 2020-01-09 NOTE — PDOC.HOSPP
- Subjective


Encounter Date: 01/09/20


Encounter Time: 11:00


Subjective: 


Patient eating well. Abdomen a little sore and had a little nausea earlier but 

no vomiting. Ambulating well. Passing flatus. Understands how to give herself 

insulin.





- Objective


Vital Signs & Weight: 


 Vital Signs (12 hours)











  Temp Pulse Resp BP Pulse Ox


 


 01/09/20 07:43  98.1 F  100  14  130/86  97


 


 01/09/20 03:25  98.3 F  91  16  136/84  97


 


 01/08/20 23:22  98.8 F  99  16  134/92 H  100


 


 01/08/20 21:35  98.9 F  111 H  18  130/86  95








 Weight











Admit Weight                   131 lb


 


Weight                         131 lb











 Most Recent Monitor Data











Heart Rate from ECG            98


 


NIBP                           126/72


 


NIBP BP-Mean                   90


 


Respiration from ECG           19


 


SpO2                           98














I&O: 


 











 01/08/20 01/09/20 01/10/20





 06:59 06:59 06:59


 


Intake Total 4123.2 1854.5 


 


Output Total 4800  


 


Balance -676.8 1854.5 











Result Diagrams: 


 01/09/20 05:01





 01/09/20 05:01


Additional Labs: 


 Accuchecks











  01/09/20 01/08/20 01/08/20





  04:08 23:29 22:35


 


POC Glucose  163 H  211 H  227 H














  01/08/20





  16:14


 


POC Glucose  162 H














Hospitalist ROS





- Review of Systems


Constitutional: denies: fever, chills


Respiratory: denies: cough, dry, shortness of breath


Cardiovascular: denies: chest pain, palpitations, orthopnea


Gastrointestinal: denies: vomiting





- Medication


Medications: 


Active Medications











Generic Name Dose Route Start Last Admin





  Trade Name Freq  PRN Reason Stop Dose Admin


 


Acetaminophen  1,000 mg  01/08/20 20:00  01/09/20 01:49





  Tylenol  WA   Not Given





  Q6H [facility]   





     





     





     





     


 


Enoxaparin Sodium  40 mg  01/06/20 09:00  01/08/20 08:47





  Lovenox  SC   Not Given





  0900 [facility]   





     





     





     





     


 


Ceftriaxone Sodium 1 gm/  100 mls @ 200 mls/hr  01/06/20 03:00  01/09/20 02:56





  Sodium Chloride  IVPB   100 mls





  Q24HR [facility]   Administration





     





     





     





     


 


Metronidazole 500 mg/ Device  100 mls @ 100 mls/hr  01/06/20 06:00  01/09/20 05:

21





  IVPB   100 mls





  Q8HR [facility]   Administration





     





     





     





     


 


Potassium Chloride 40 meq/  270 mls @ 135 mls/hr  01/06/20 03:06  01/07/20 09:35





  Sodium Chloride  IVPB   270 mls





  ASDIR PRN   Administration





  FOR SERUM K+ 2.5 - 3.5   





     





     





     


 


Potassium Phosphate 9 mmol/  103 mls @ 25.75 mls/hr  01/06/20 03:06  01/07/20 04

:56





  Sodium Chloride  IVPB   103 mls





  ASDIR PRN   Administration





  Phosphate 1.0-1.8   





     





     





     


 


Sodium Chloride  1,000 mls @ 75 mls/hr  01/08/20 10:30  01/08/20 22:21





  1/2 Normal Saline  IV   1,000 mls





  .[id number] [facility]   Administration





     





     





     





     


 


Insulin Glargine 30 units/  0.3 mls @ 0 mls/hr  01/08/20 21:00  01/08/20 22:51





  Miscellaneous Medication  SC   Not Given





  HS [facility]   





     





     





     





     


 


Insulin Human Regular  0 units  01/08/20 06:17  01/08/20 22:43





  Humulin R  SC   3 units





  .MILD SLIDING SCALE PRN   Administration





  Mild Correctional Scale   





     





     





     


 


Miscellaneous Medication  1 pkt  01/06/20 03:06  01/06/20 04:31





  Phos-Nak  PO   1 pkt





  TIDPRN PRN   Administration





  FOR PHOS LEVEL 1.0 - 1.8   





     





     





     


 


Morphine Sulfate  4 mg  01/06/20 15:38  01/09/20 03:58





  Morphine  SLOW IVP   4 mg





  Q4H PRN   Administration





  Severe Pain (7-10)   





     





     





     


 


Ondansetron HCl  4 mg  01/06/20 03:00  01/09/20 00:09





  Zofran  IVP   4 mg





  Q6H PRN   Administration





  Nausea/Vomiting   





     





     





     


 


Pantoprazole Sodium  40 mg  01/08/20 09:00  01/08/20 22:04





  Protonix  IVP   Not Given





  DAILY [facility]   





     





     





     





     














- Exam


General Appearance: NAD, awake alert


Eye: anicteric sclera


ENT: moist mucosa


Heart: RRR, no murmur, no gallops, no rubs


Respiratory: CTAB, no wheezes, no rales, no ronchi


Gastrointestinal: soft, non-distended, normal bowel sounds, no palpable masses


Gastrointestinal - other findings: mild TTP


Psychiatric: normal affect, normal behavior, A&O x 3





Hosp A/P


(1) Diabetic ketoacidosis


Code(s): E11.10 - TYPE 2 DIABETES MELLITUS WITH KETOACIDOSIS WITHOUT COMA   

Status: Resolved   





(2) Diabetes mellitus


Code(s): E11.9 - TYPE 2 DIABETES MELLITUS WITHOUT COMPLICATIONS   Status: Acute

   





(3) Acute acalculous cholecystitis


Code(s): K81.0 - ACUTE CHOLECYSTITIS   Status: Resolved   


Plan: 


s/p lap jenn 1/8/2020








- Plan


Leukocytosis resolved, on Rocephin and Metronidazole, Blood cultures NGTD


Normoglycemic DKA, unusual presentation but has responded very well to IV sugar 

and insulin, gap closed and bicarb normalized





Transitioned to long acting insulin with good control of blood sugars





s/p lap jenn 1/8/2020





HbA1c 12, patient has likely had undiagnosed DM for some time, possibly ever 

since her pregnancy. May be Type 1 now, will need insulin


on discharge.





Plan on education, instruction on how to give insulin and check blood sugars, 

likely home tomorrow with a few more


days of oral antibiotics.





DVT proph: Lovenox and SCDs

## 2020-01-09 NOTE — PRG
DATE OF SERVICE:  01/09/2020



SUBJECTIVE:  Ms. Baez is a 20-year-old woman who is postop day #1, status post

laparoscopic cholecystectomy. 



She recently developed DKA, which was thought to be secondary to acute acalculous

cholecystitis. 



Currently DKA is resolved. 



This morning, she reports adequate pain control. 



She is tolerating ice chips. 



She ambulates with minimum difficulty.



OBJECTIVE:  VITAL SIGNS:  Remain stable.  Blood pressure 136/84, pulse 91,

respiratory rate 16, temperature 98.3 degrees Fahrenheit, oxygen saturation 97% on

room air. 

HEART:  Regular rate and rhythm. 

LUNGS:  Clear to auscultation bilaterally.  Breathing, regular and nonlabored. 

ABDOMEN:  Soft and nondistended. 

Incisions are intact, clean, dry with incisional tenderness to palpation. 

Clearly, she has no peritoneal signs on examination.



LABORATORY FINDINGS:  Today include a CBC with 7300 white blood cells, hemoglobin

and hematocrit are 10.9 and 32.6 respectively. 



Platelet count is 254,000. 



Differential counts are 51% neutrophils, 5 bands, 38 lymphocytes, 5 monocytes, and 1

eosinophil. 



Metabolic profile; sodium 138, potassium 3.3, chloride is 102, bicarb is 25, BUN is

6, creatinine 0.53, glucose 160, AST and ALT 49 and 45 respectively. 



Total bilirubin is 0.2.



IMPRESSION:  Postop day #1, status post laparoscopic cholecystectomy, otherwise

hemodynamically stable. 



PLAN:  

1. Advance diet as tolerated.

2. The patient may be discharged at the discretion of the primary service.  She

follows up with me in the Surgery Clinic in 2 weeks. 







Job ID:  [id number]

## 2020-01-10 VITALS — SYSTOLIC BLOOD PRESSURE: 151 MMHG | TEMPERATURE: 98.4 F | DIASTOLIC BLOOD PRESSURE: 78 MMHG

## 2020-01-10 LAB
ANION GAP SERPL CALC-SCNC: 12 MMOL/L (ref 10–20)
BASOPHILS # BLD AUTO: 0 THOU/UL (ref 0–0.2)
BASOPHILS NFR BLD AUTO: 0.2 % (ref 0–1)
BUN SERPL-MCNC: 6 MG/DL (ref 7–18.7)
CALCIUM SERPL-MCNC: 8.4 MG/DL (ref 7.8–10.44)
CHLORIDE SERPL-SCNC: 100 MMOL/L (ref 98–107)
CO2 SERPL-SCNC: 28 MMOL/L (ref 22–29)
CREAT CL PREDICTED SERPL C-G-VRATE: 167 ML/MIN (ref 70–130)
EOSINOPHIL # BLD AUTO: 0 THOU/UL (ref 0–0.7)
EOSINOPHIL NFR BLD AUTO: 0.8 % (ref 0–10)
GLUCOSE SERPL-MCNC: 167 MG/DL (ref 70–105)
HGB BLD-MCNC: 11.4 G/DL (ref 12–16)
LYMPHOCYTES # BLD: 2.2 THOU/UL (ref 1.2–3.4)
LYMPHOCYTES NFR BLD AUTO: 41.8 % (ref 21–51)
MCH RBC QN AUTO: 29.9 PG (ref 27–31)
MCV RBC AUTO: 86.7 FL (ref 78–98)
MONOCYTES # BLD AUTO: 0.5 THOU/UL (ref 0.11–0.59)
MONOCYTES NFR BLD AUTO: 8.6 % (ref 0–10)
NEUTROPHILS # BLD AUTO: 2.6 THOU/UL (ref 1.4–6.5)
NEUTROPHILS NFR BLD AUTO: 48.6 % (ref 42–75)
PLATELET # BLD AUTO: 274 THOU/UL (ref 130–400)
POTASSIUM SERPL-SCNC: 3 MMOL/L (ref 3.5–5.1)
RBC # BLD AUTO: 3.81 MILL/UL (ref 4.2–5.4)
SODIUM SERPL-SCNC: 137 MMOL/L (ref 136–145)
WBC # BLD AUTO: 5.4 THOU/UL (ref 4.8–10.8)

## 2020-01-10 RX ADMIN — INSULIN HUMAN PRN UNITS: 100 INJECTION, SOLUTION PARENTERAL at 10:11

## 2020-01-10 RX ADMIN — CEFTRIAXONE SCH MLS: 1 INJECTION, POWDER, FOR SOLUTION INTRAMUSCULAR; INTRAVENOUS at 02:14

## 2020-01-10 RX ADMIN — INSULIN HUMAN PRN UNITS: 100 INJECTION, SOLUTION PARENTERAL at 05:05

## 2020-01-10 RX ADMIN — METRONIDAZOLE SCH MLS: 500 INJECTION, SOLUTION INTRAVENOUS at 05:04

## 2020-01-10 RX ADMIN — METRONIDAZOLE SCH MLS: 500 INJECTION, SOLUTION INTRAVENOUS at 13:34

## 2020-01-10 RX ADMIN — INSULIN HUMAN PRN UNITS: 100 INJECTION, SOLUTION PARENTERAL at 13:34

## 2020-01-10 NOTE — DIS
DATE OF ADMISSION:  01/06/2020



DATE OF DISCHARGE:  01/10/2020



CONSULTANTS:  Dr. Rose of General Surgery.



PROCEDURES PERFORMED:  Laparoscopic cholecystectomy on January 8th.



MEDICATIONS:  Medications were reconciled at discharge: 

New medications: 

1. Tylenol 1000 mg every 6 hours as needed for pain.

2. Cefdinir 300 mg every 12 hours for a total of 4 doses starting tomorrow.

3. Lantus 30 units injected subcutaneous at night.

4. Disposable needles that go along with the Lantus SoloStar Pen.

5. Flagyl 500 mg every 8 hours for six doses total.



Separately ordered is a glucometer test strips, lancets and alcohol swab to 
test.



FINAL DIAGNOSES:  

1. Diabetic ketoacidosis, resolved.

2. New diagnosis of diabetes mellitus, unknown type.

3. Acalculous cholecystitis, now status post laparoscopic cholecystectomy.

4. Leukocytosis, resolved.

5. Hypokalemia.

6. Fatty liver.



HISTORY OF PRESENT ILLNESS:  Ms. Nestor Sosa is a now 20-year-old female with

history of gestational diabetes, who presented to the emergency room with a

complaint of pain, nausea, vomiting.  The patient was found to be in DKA, 
admitted

to the ICU and started on insulin drip per protocol.  She was also started on

antibiotics with Rocephin and Flagyl.



HOSPITAL COURSE:  The patient was managed on IV antibiotics, IV insulin, IV 
fluids

for hydration, and tolerated this well.  She was transitioned over to once daily

Lantus with insulin sliding scale.  The type of diabetes has not been 
characterized

here.  Her hemoglobin A1c is 12.2, she will need follow up in the outpatient 
setting

to characterize the type of diabetes, and adjust/titrate medications to affect.
  I

am discharging her simply with once daily Lantus and close followup with her 
primary

care provider. 



For the acalculous cholecystitis, the patient was evaluated with HIDA scan on

January 7th, which showed chronic acalculous cholecystitis and gallbladder

dyskinesia.  She was evaluated by Dr. Rose of General Surgery and underwent

laparoscopic cholecystectomy on January 8th.  She is overall doing well, 
ambulating,

tolerating a carbohydrate consistent diet, and does meet criteria for discharge 
to

home. 



The patient will need to be monitoring her blood sugars at least twice a day, 
and

any time that she is having symptoms that may suggest either an elevated or low

blood sugar.  Her blood sugars have been ranging here from 167 this morning to a

high of 253 before lunch.  Given this new diagnosis and the goal to get the 
patient

started on treatment, only one type of insulin is being prescribed at 
discharge. 



For pain, the patient has only required tylenol in the past 24 hours.  She had 
an unusual reaction to tramadol the day prior which resolved.  She can continue 
tylenol as needed.



PHYSICAL EXAMINATION:

VITAL SIGNS:  On day of discharge, blood pressure 131/86, pulse 92, temp 98.6,

respirations 20, sat 100% on room air. 

GENERAL:  Awake, alert, responsive, in no apparent distress.  Able to speak in 
full

sentences. 

LUNGS:  Clear to auscultation bilateral. 

HEART:  Normal S1, S2.  Regular rate and rhythm. 

ABDOMEN:  Soft with present bowel sounds. 

EXTREMITIES:  No clubbing, cyanosis, or edema.



LABORATORY DATA:  Key findings and test results:  CBC today; 5.4, 11.4, 33.0, 
274,

on admission, her white blood cell count was 15.5. 

VBGs on admission 7.17, 32.7, 33.  Bicarbonate was 12. 



Renal panel today; 137, 3.0, 128, 6, 0.5, 167.  LFTs on January 9, T bilirubin 
0.2,

AST 49, ALT 45, alkaline phosphatase 63, total protein 6.2, albumin 3.2. 



Hemoglobin A1c was 12.2. 



On admission, her CO2 level was less than 8. 



Serum pregnancy test was negative. 



Urine culture and blood cultures from January 6, no growth. 



Surgical specimen report shows chronic cholecystitis. 



Nuclear medicine hepatobiliary scan on January 7th, findings consistent with 
chronic

acalculous cholecystitis and gallbladder dyskinesia. 



Abdominal ultrasound on January 5th shows no evidence of gallstones, minimal 
fluid

or edema adjacent to the gallbladder but no gallbladder wall thickening, 
positive

Anthony sign and liver with diffuse fatty change and slightly enlarged. 



FOLLOWUP:  Dr. Rose on January 21, 2020, 10:30 a.m. 



Following up with the primary care provider about new diagnosis of diabetes and

adjust medications, about fatty liver and discussing diet, and any other health

needs. 



Follow up with the living well class with diabetes.  The patient received

information. 



ACTIVITIES:  As tolerated.



DIET:  Carbohydrate consistent.



CODE STATUS:  Full.



DISCHARGE DISPOSITION:  Home. 



Reviewed with patient this hospitalization, the new diagnoses, the return for 
care

precautions, the importance of checking her blood sugars and to seek care

precautions.  She demonstrates understanding. 



TIME SPENT:  Total time coordinating discharge is 40 minutes.





Job ID:  [id number]



[email or web address]

## 2020-01-10 NOTE — PRG
DATE OF SERVICE:  01/10/2020



SUBJECTIVE:  Ms. Baez is 20 years old, postoperative day 2 status post 
laparoscopic

cholecystectomy.  The patient reports doing good.  Pain is well controlled. 



Her vital signs have been stable.  She reports  passed some

gas, but not yet having bowel  Pain is well controlled.  Dr. Rose has seen her 
this morning. 



Her diet will be advanced.  Encouraged physical activity.  From General Surgery

standpoint, the patient is good to be discharged and this is requested from 
Primary

Service to be discharged at this time.  She will follow up with General Surgery

Clinic in 2 weeks. 







Job ID:  [id number]



[facility]